# Patient Record
Sex: FEMALE | Race: WHITE | NOT HISPANIC OR LATINO | Employment: UNEMPLOYED | URBAN - METROPOLITAN AREA
[De-identification: names, ages, dates, MRNs, and addresses within clinical notes are randomized per-mention and may not be internally consistent; named-entity substitution may affect disease eponyms.]

---

## 2021-08-10 RX ORDER — FEXOFENADINE HYDROCHLORIDE 60 MG/1
1 TABLET, FILM COATED ORAL AS NEEDED
COMMUNITY

## 2021-08-11 ENCOUNTER — OFFICE VISIT (OUTPATIENT)
Dept: OBGYN CLINIC | Facility: CLINIC | Age: 51
End: 2021-08-11
Payer: COMMERCIAL

## 2021-08-11 ENCOUNTER — VBI (OUTPATIENT)
Dept: ADMINISTRATIVE | Facility: OTHER | Age: 51
End: 2021-08-11

## 2021-08-11 VITALS
DIASTOLIC BLOOD PRESSURE: 72 MMHG | WEIGHT: 124 LBS | SYSTOLIC BLOOD PRESSURE: 120 MMHG | BODY MASS INDEX: 21.97 KG/M2 | HEIGHT: 63 IN

## 2021-08-11 DIAGNOSIS — Z12.31 SCREENING MAMMOGRAM, ENCOUNTER FOR: ICD-10-CM

## 2021-08-11 DIAGNOSIS — Z12.4 ENCOUNTER FOR PAPANICOLAOU SMEAR FOR CERVICAL CANCER SCREENING: Primary | ICD-10-CM

## 2021-08-11 DIAGNOSIS — Z01.812 PRE-PROCEDURE LAB EXAM: ICD-10-CM

## 2021-08-11 DIAGNOSIS — N92.1 MENORRHAGIA WITH IRREGULAR CYCLE: ICD-10-CM

## 2021-08-11 LAB — SL AMB POCT URINE HCG: NEGATIVE

## 2021-08-11 PROCEDURE — 81025 URINE PREGNANCY TEST: CPT | Performed by: NURSE PRACTITIONER

## 2021-08-11 PROCEDURE — 99213 OFFICE O/P EST LOW 20 MIN: CPT | Performed by: NURSE PRACTITIONER

## 2021-08-11 PROCEDURE — 58100 BIOPSY OF UTERUS LINING: CPT | Performed by: NURSE PRACTITIONER

## 2021-08-11 PROCEDURE — G0145 SCR C/V CYTO,THINLAYER,RESCR: HCPCS | Performed by: NURSE PRACTITIONER

## 2021-08-11 PROCEDURE — 88305 TISSUE EXAM BY PATHOLOGIST: CPT | Performed by: PATHOLOGY

## 2021-08-11 PROCEDURE — G0476 HPV COMBO ASSAY CA SCREEN: HCPCS | Performed by: NURSE PRACTITIONER

## 2021-08-11 RX ORDER — OMEGA-3 FATTY ACIDS/FISH OIL 300-1000MG
CAPSULE ORAL 3 TIMES DAILY
COMMUNITY

## 2021-08-11 NOTE — PROGRESS NOTES
Assessment/Plan:  Menorrhagia with irreg periods  Possible causes include fibroids, polyps, normal menstrual changes, dysplasia/cancer  Pt to track cycles This is first cycle which was abnormally close   US to evaluate uterus, endometrial bx peformed, CBC, TSH  Reviewed normal cycle to call < 21 days apart lasting longer than 10 days soaking an ultra tampon in an hour  Management moving forward low dose pill, progesterone, IUD  Diagnoses and all orders for this visit:    Encounter for Papanicolaou smear for cervical cancer screening  -     Liquid-based pap, screening  -     HPV High Risk    Pre-procedure lab exam  -     POCT urine HCG    Menorrhagia with irregular cycle  -     CBC and Platelet; Future  -     TSH w/Reflex; Future  -     US pelvis complete w transvaginal  -     CBC and Platelet  -     TSH w/Reflex  -     Tissue Exam    Screening mammogram, encounter for  -     Mammo screening bilateral w 3d & cad; Future    Other orders  -     Multiple Vitamin (MULTIVITAMINS PO); Take 1 tablet by mouth daily  -     fexofenadine (ALLEGRA) 60 MG tablet; Take 1 tablet by mouth as needed (Patient not taking: Reported on 8/11/2021)  -     Ibuprofen 200 MG CAPS; 3 (three) times a day  -     Cancel: Tissue Pathology          Subjective:      Patient ID: Katey Razo is a 54 y o  female  Here for eval irreg bleeding  [facility] 10/2018 DR Prather Last pap 6/2017 neg/neg  Periods pretty reg , however over the past 3 months have become irregular, more frequent Thinks it is  Perimenopause  2 weeks ago normal period ended and a few days later came back lasts a few days 4-5 days light flow but has to wear tampon  Was feeling run down Bleeding stopped Feels better this week  No cramps  No breast tenderness  No other abdominal or pelvic pain Bowel and bladder are normal  Weight stable  Tubal for bc         The following portions of the patient's history were reviewed and updated as appropriate: allergies, current medications, past family history, past medical history, past social history, past surgical history and problem list     Review of Systems   Constitutional: Positive for fatigue  Gastrointestinal: Negative for abdominal pain, constipation and diarrhea  Genitourinary: Positive for menstrual problem and vaginal bleeding  Negative for dysuria and pelvic pain  Objective:      /72   Ht 5' 3" (1 6 m)   Wt 56 2 kg (124 lb)   LMP 08/04/2021 (Within Days)   Breastfeeding No   BMI 21 97 kg/m²          Physical Exam  Vitals and nursing note reviewed  Constitutional:       General: She is not in acute distress  Appearance: Normal appearance  HENT:      Head: Normocephalic and atraumatic  Pulmonary:      Effort: Pulmonary effort is normal    Chest:      Breasts: Breasts are symmetrical          Right: Normal  No mass, nipple discharge, skin change or tenderness  Left: Normal  No mass, nipple discharge, skin change or tenderness  Abdominal:      General: There is no distension  Palpations: Abdomen is soft  Tenderness: There is no abdominal tenderness  There is no guarding or rebound  Genitourinary:     General: Normal vulva  Exam position: Lithotomy position  Labia:         Right: No rash, tenderness, lesion or injury  Left: No rash, tenderness, lesion or injury  Urethra: No prolapse, urethral pain, urethral swelling or urethral lesion  Vagina: Normal  No erythema or lesions  Cervix: No cervical motion tenderness, discharge, lesion or cervical bleeding  Uterus: Normal        Adnexa: Right adnexa normal and left adnexa normal         Right: No mass or tenderness  Left: No mass or tenderness  Rectum: No mass or external hemorrhoid  Comments: PAP obtained cervix EMB performed  Musculoskeletal:         General: Normal range of motion  Lymphadenopathy:      Upper Body:      Right upper body: No axillary adenopathy        Left upper body: No axillary adenopathy  Lower Body: No right inguinal adenopathy  No left inguinal adenopathy  Skin:     General: Skin is warm and dry  Neurological:      Mental Status: She is alert and oriented to person, place, and time  Psychiatric:         Mood and Affect: Mood normal          Behavior: Behavior normal          Thought Content: Thought content normal          Judgment: Judgment normal        Endometrial biopsy    Date/Time: 8/18/2021 6:16 AM  Performed by: JAMIE Jara  Authorized by: JAMIE Jara   Universal Protocol:  Procedure performed by:  Consent: Verbal consent obtained  Risks and benefits: risks, benefits and alternatives were discussed  Consent given by: patient  Patient understanding: patient states understanding of the procedure being performed  Patient identity confirmed: verbally with patient      Indication:     Indications:  Other disorder of menstruation and other abnormal bleeding from female genital tract    Procedure:     Procedure: endometrial biopsy with Pipelle      A bivalve speculum was placed in the vagina: yes      Cervix cleaned and prepped: yes      Uterus sounded: yes      Uterus sound depth (cm):  7    Specimen collected: specimen collected and sent to pathology      Patient tolerated procedure well with no complications: yes

## 2021-08-13 LAB
HPV HR 12 DNA CVX QL NAA+PROBE: NEGATIVE
HPV16 DNA CVX QL NAA+PROBE: NEGATIVE
HPV18 DNA CVX QL NAA+PROBE: NEGATIVE

## 2021-08-17 LAB
LAB AP GYN PRIMARY INTERPRETATION: NORMAL
LAB AP LMP: NORMAL
Lab: NORMAL

## 2021-08-18 NOTE — PATIENT INSTRUCTIONS
Menorrhagia with irreg periods  Possible causes include fibroids, polyps, normal menstrual changes, dysplasia/cancer  Pt to track cycles This is first cycle which was abnormally close   US to evaluate uterus, endometrial bx peformed, CBC, TSH  Reviewed normal cycle to call < 21 days apart lasting longer than 10 days soaking an ultra tampon in an hour  Management moving forward low dose pill, progesterone, IUD

## 2022-12-08 NOTE — PROGRESS NOTES
Assessment/Plan:    Encounter for gynecological examination (general) (routine) without abnormal findings  Here for well check  Notes cycle changes with LMP 22 - today  This happens approximately 4 times a year  Had u/s, TSH , endo bx last year, all WNL  Normal exam, u/s ordered  Will schedule 6 wk follow up  Thyroid nodule noted as in past    Normal breast exam  Mammo order given, last 2021  Last pap 21 neg/HPV neg; due     Colonoscopy ,  due     Thyroid nodule  Endocrine biopsy , was to follow up 2018  Ultrasound ordered and encouraged f/u with [facility] endocrinology  Dysuria  4 courses of antibiotics per year, no cultures  UTIs post coital    Recommend urinalysis and culture with next bout prior to antibiotics and then to consider post coital macrobid  Diagnoses and all orders for this visit:    Encounter for gynecological examination (general) (routine) without abnormal findings    Encounter for screening mammogram for malignant neoplasm of breast  -     Mammo screening bilateral w 3d & cad; Future    Irregular bleeding  -     US pelvis complete w transvaginal; Future    Thyroid nodule  -     US head neck soft tissue; Future    Dysuria  -     Urine culture; Future  -     Cancel: Urinalysis with microscopic  -     Urine culture  -     Urinalysis with microscopic          Subjective:      Patient ID: Donte Saunders is a 50 y o  female  HPI Here for well check  The following portions of the patient's history were reviewed and updated as appropriate:   She  has a past medical history of H/O mammogram (10/01/2018), Papanicolaou smear for cervical cancer screening (), and Thyroid nodule    She   Patient Active Problem List    Diagnosis Date Noted   • Encounter for gynecological examination (general) (routine) without abnormal findings 2022   • Thyroid nodule 2022   • Dysuria 2022     She  has a past surgical history that includes  section; Tubal ligation; US guided thyroid biopsy (2017); Mammo (historical) (Bilateral, 2018); and Colonoscopy (2020)  Her family history includes Stroke in her maternal grandfather; Stroke (age of onset: 70) in her mother  She  reports that she has never smoked  She has never used smokeless tobacco  She reports current alcohol use  She reports that she does not use drugs  Current Outpatient Medications   Medication Sig Dispense Refill   • Multiple Vitamin (MULTIVITAMINS PO) Take 1 tablet by mouth daily       No current facility-administered medications for this visit  She is allergic to vancomycin       Review of Systems  +irregular bleeding  No breast or bowel complaints     +dysuria postcoitally, approx 4 times per year      Objective:      /70 (BP Location: Left arm, Patient Position: Sitting, Cuff Size: Standard)   Ht 5' 2" (1 575 m)   Wt 56 1 kg (123 lb 9 6 oz)   LMP 11/28/2022 (Exact Date)   Breastfeeding No   BMI 22 61 kg/m²          Physical Exam    General appearance: no distress, pleasant  Neck: thyroid with prominent right nodule, no palpable adenopathy  Lymph nodes: no palpable adenopathy  Breasts: no masses, nodes or skin changes  Abdomen: soft, non tender, no palpable masses  Pelvic exam: normal external genitalia, urethral meatus normal, vagina without lesions, cervix without lesions, nulliparous, light bloody mucous, uterus small, non tender, no adnexal masses, non tender  Rectal exam: no masses, non tender, RV confirms above

## 2022-12-12 ENCOUNTER — ANNUAL EXAM (OUTPATIENT)
Dept: OBGYN CLINIC | Facility: CLINIC | Age: 52
End: 2022-12-12

## 2022-12-12 VITALS
DIASTOLIC BLOOD PRESSURE: 70 MMHG | BODY MASS INDEX: 22.74 KG/M2 | SYSTOLIC BLOOD PRESSURE: 110 MMHG | HEIGHT: 62 IN | WEIGHT: 123.6 LBS

## 2022-12-12 DIAGNOSIS — N92.6 IRREGULAR BLEEDING: ICD-10-CM

## 2022-12-12 DIAGNOSIS — E04.1 THYROID NODULE: ICD-10-CM

## 2022-12-12 DIAGNOSIS — R30.0 DYSURIA: ICD-10-CM

## 2022-12-12 DIAGNOSIS — Z01.419 ENCOUNTER FOR GYNECOLOGICAL EXAMINATION (GENERAL) (ROUTINE) WITHOUT ABNORMAL FINDINGS: Primary | ICD-10-CM

## 2022-12-12 DIAGNOSIS — Z12.31 ENCOUNTER FOR SCREENING MAMMOGRAM FOR MALIGNANT NEOPLASM OF BREAST: ICD-10-CM

## 2022-12-12 NOTE — ASSESSMENT & PLAN NOTE
Endocrine biopsy 2017, was to follow up 2018  Ultrasound ordered and encouraged f/u with [facility] endocrinology

## 2022-12-12 NOTE — LETTER
December 12, 2022     [address]  [facility] [address]    Patient: Radha Araiza   YOB: 1970   Date of Visit: 12/12/2022       Dear Dr Giselle Kitchen: Thank you for referring Radha Araiza to me for evaluation  Below are my notes for this consultation  If you have questions, please do not hesitate to call me  I look forward to following your patient along with you  Sincerely,        Marlon Harvey MD        CC: No Recipients  Marlon Harvey MD  12/12/2022  4:52 PM  Sign when Signing Visit  Assessment/Plan:    Encounter for gynecological examination (general) (routine) without abnormal findings  Here for well check  Notes cycle changes with LMP 11/28/22 - today  This happens approximately 4 times a year  Had u/s, TSH , endo bx last year, all WNL  Normal exam, u/s ordered  Will schedule 6 wk follow up  Thyroid nodule noted as in past    Normal breast exam  Mammo order given, last 9/14/2021  Last pap 8/11/21 neg/HPV neg; due 2026    Colonoscopy 2020,  due 2025    Thyroid nodule  Endocrine biopsy 2017, was to follow up 2018  Ultrasound ordered and encouraged f/u with [facility] endocrinology  Dysuria  4 courses of antibiotics per year, no cultures  UTIs post coital    Recommend urinalysis and culture with next bout prior to antibiotics and then to consider post coital macrobid  Diagnoses and all orders for this visit:    Encounter for gynecological examination (general) (routine) without abnormal findings    Encounter for screening mammogram for malignant neoplasm of breast  -     Mammo screening bilateral w 3d & cad; Future    Irregular bleeding  -     US pelvis complete w transvaginal; Future    Thyroid nodule  -     US head neck soft tissue; Future    Dysuria  -     Urine culture; Future  -     Cancel: Urinalysis with microscopic  -     Urine culture  -     Urinalysis with microscopic         Subjective:     Patient ID: Radha Araiza is a 50 y o  female      HPI Here for well check  The following portions of the patient's history were reviewed and updated as appropriate:   She  has a past medical history of H/O mammogram (10/01/2018), Papanicolaou smear for cervical cancer screening (), and Thyroid nodule  She   Patient Active Problem List    Diagnosis Date Noted   • Encounter for gynecological examination (general) (routine) without abnormal findings 2022   • Thyroid nodule 2022   • Dysuria 2022     She  has a past surgical history that includes  section; Tubal ligation; US guided thyroid biopsy (); Mammo (historical) (Bilateral, ); and Colonoscopy ()  Her family history includes Stroke in her maternal grandfather; Stroke (age of onset: 70) in her mother  She  reports that she has never smoked  She has never used smokeless tobacco  She reports current alcohol use  She reports that she does not use drugs  Current Outpatient Medications   Medication Sig Dispense Refill   • Multiple Vitamin (MULTIVITAMINS PO) Take 1 tablet by mouth daily       No current facility-administered medications for this visit  She is allergic to vancomycin       Review of Systems +irregular bleeding  No breast or bowel complaints     +dysuria postcoitally, approx 4 times per year      Objective:      /70 (BP Location: Left arm, Patient Position: Sitting, Cuff Size: Standard)   Ht 5' 2" (1 575 m)   Wt 56 1 kg (123 lb 9 6 oz)   LMP 2022 (Exact Date)   Breastfeeding No   BMI 22 61 kg/m²         Physical Exam   General appearance: no distress, pleasant  Neck: thyroid with prominent right nodule, no palpable adenopathy  Lymph nodes: no palpable adenopathy  Breasts: no masses, nodes or skin changes  Abdomen: soft, non tender, no palpable masses  Pelvic exam: normal external genitalia, urethral meatus normal, vagina without lesions, cervix without lesions, nulliparous, light bloody mucous, uterus small, non tender, no adnexal masses, non tender  Rectal exam: no masses, non tender, RV confirms above

## 2022-12-12 NOTE — ASSESSMENT & PLAN NOTE
4 courses of antibiotics per year, no cultures  UTIs post coital    Recommend urinalysis and culture with next bout prior to antibiotics and then to consider post coital macrobid

## 2022-12-12 NOTE — ASSESSMENT & PLAN NOTE
Here for well check  Notes cycle changes with LMP 11/28/22 - today  This happens approximately 4 times a year  Had u/s, TSH , endo bx last year, all WNL  Normal exam, u/s ordered    Thyroid nodule noted as in past    Normal breast exam  Mammo order given, last 9/14/2021  Last pap 8/11/21 neg/HPV neg; due 2026    Colonoscopy 2020,  due 2025

## 2022-12-22 ENCOUNTER — TELEPHONE (OUTPATIENT)
Dept: OBGYN CLINIC | Facility: CLINIC | Age: 52
End: 2022-12-22

## 2022-12-22 NOTE — TELEPHONE ENCOUNTER
Mansi inquiring about her Ultrasound results  Left message on Mansi's voice mail, results still pending  [facility] is experiencing approx 14 day turn around time  Recommended to f/u with PCP as to lab results  Will send message to Dr Robert Fuller, once reviewed will contact her

## 2022-12-27 ENCOUNTER — TELEPHONE (OUTPATIENT)
Dept: OBGYN CLINIC | Facility: CLINIC | Age: 52
End: 2022-12-27

## 2022-12-27 DIAGNOSIS — Z13.29 SCREENING FOR THYROID DISORDER: ICD-10-CM

## 2022-12-27 DIAGNOSIS — E04.1 THYROID NODULE: Primary | ICD-10-CM

## 2022-12-27 NOTE — TELEPHONE ENCOUNTER
Please call Mansi with results of pelvic and thyroid ultrasounds  I sent this [employer] message:    Kareem Nazario  Your the ultrasound of your pelvis looks fine, with a very active right ovary  There are no suspicious characteristics to the cysts seen on the right  They are most likely due to ovulation and post-ovulation changes  We will need to repeat the scan in 6-8 wks to see that the cysts have resolved  Your thyroid does need some attention  The ultrasound of it showed a 2 8 cm nodule that warrants endocrinology evaluation with possible biopsy  They also noted  an area below the left lobe that may be a node or parathyroid adenoma  I have asked my clinical staff to reach out you with those recommendations  I can order some blood work for you prior to an endocrinology appointment that may be help to make things more efficient  I placed orders for parathyroid hormone, vitamin D, calcium and thyroid function tests     Mansi has been seen at [facility] endocrinology in the past  Other options would be Endocrinologists:  Mandy Hamilton MD  Phone: [phone] - [address] [facility],  Phone: [phone] - Anitra Garcia MD, [phone] - [facility]

## 2023-01-09 DIAGNOSIS — Z12.31 ENCOUNTER FOR SCREENING MAMMOGRAM FOR MALIGNANT NEOPLASM OF BREAST: ICD-10-CM

## 2023-01-21 NOTE — PROGRESS NOTES
Assessment/Plan:    Irregular bleeding  Has not skipped any cycles  Bled irregularly from LMP  through December  Last endo bx benign 2021  No bleeding today   Ultrasound reviewed with 6 mm EMS and right ovarian cysts - 4 9 and 5 cm  U/S ordered for follow up  Option of endo bx reviewed, would like to proceed  Endo bx with ease, will call with results  Option for cyclic progesterone reviewed for regularity, will discuss after biopsy and imaging f/u resulted  Scheduled to see rheumatology for elevated ARA and autoimmune aspect of Hashimoto thyroiditis  Other ovarian cyst, right side  Noted on imaging, asymptomatic  Recommend period track feng and follow up u/s after next cycle  Orders given  Thyroid nodule  Reviewed telehealth visit on Care Everywhere and is scheduled for biopsy with [facility] team of larger nodule  Diagnoses and all orders for this visit:    Irregular bleeding  -     US pelvis complete w transvaginal; Future  -     Endometrial biopsy  -     Histology Specimen    Thyroid nodule    Other ovarian cyst, right side    Other orders  -     Discontinue: metroNIDAZOLE (METROCREAM) 0 75 % cream;  (Patient not taking: Reported on 2023)          Subjective:      Patient ID: Katina Gowers is a 50 y o  female  HPI  Here to follow up testing and address irregular bleeding  The following portions of the patient's history were reviewed and updated as appropriate:   She  has a past medical history of H/O mammogram (10/01/2018), Papanicolaou smear for cervical cancer screening (), and Thyroid nodule    She   Patient Active Problem List    Diagnosis Date Noted   • Irregular bleeding 2023   • Other ovarian cyst, right side 2023   • Encounter for gynecological examination (general) (routine) without abnormal findings 2022   • Thyroid nodule 2022     She  has a past surgical history that includes  section; Tubal ligation; US guided thyroid biopsy (2017); Mammo (historical) (Bilateral, 2018); and Colonoscopy (2020)  Her family history includes Stroke in her maternal grandfather; Stroke (age of onset: 70) in her mother  She  reports that she has never smoked  She has never used smokeless tobacco  She reports current alcohol use  She reports that she does not use drugs  Current Outpatient Medications   Medication Sig Dispense Refill   • Multiple Vitamin (MULTIVITAMINS PO) Take 1 tablet by mouth daily       No current facility-administered medications for this visit  She is allergic to vancomycin       Review of Systems  Bleeding stopped  No pain  Objective:      /70   Ht 5' 2" (1 575 m)   Wt 55 2 kg (121 lb 12 8 oz)   LMP 01/10/2023 (Approximate)   Breastfeeding No   BMI 22 28 kg/m²          Physical Exam    Appears well, no apparent distress  Does not appear anxious or depressed  Pelvic exam: normal external genitalia, vagina normal no abnormal discharge, cervix without lesions    12/26/22 u/s : Uterus 7 7 cm with 6 mm EMS with small amt of fluid in lower uterine segment  R ov 7 3 cm with 4 9 and 5 cm cysts  L ov 2 5 cm, WNL  Images reviewed  Right ovary with very thin septation and probable hemorrhagic cyst adjacent to simple cyst     12/26/22 thyroid u/s: 2 8 cm right nodule; 1 2 cm lesion below left lobe    All labs reviewed - ARA, TSH, PTH, thyroid u/s, pelvic u/s    Endometrial biopsy    Date/Time: 1/24/2023 1:56 PM  Performed by: Jes Steven MD  Authorized by: Jes Steven MD   Universal Protocol:  Consent: Verbal consent obtained  Written consent obtained    Risks and benefits: risks, benefits and alternatives were discussed  Consent given by: patient  Patient understanding: patient states understanding of the procedure being performed  Patient consent: the patient's understanding of the procedure matches consent given  Relevant documents: relevant documents present and verified  Patient identity confirmed: verbally with patient      Indication:     Indications:  Other disorder of menstruation and other abnormal bleeding from female genital tract    Procedure:     Procedure: endometrial biopsy with Pipelle      A bivalve speculum was placed in the vagina: yes      Cervix cleaned and prepped: yes      A paracervical block was performed: no      The cervix was dilated: no      Uterus sounded: yes      Uterus sound depth (cm):  7    Specimen collected: specimen collected and sent to pathology      Patient tolerated procedure well with no complications: yes    Findings:     Uterus size:  Non-gravid    Cervix: normal    Comments:     Procedure comments:  Endo bx with ease

## 2023-01-24 ENCOUNTER — OFFICE VISIT (OUTPATIENT)
Dept: OBGYN CLINIC | Facility: CLINIC | Age: 53
End: 2023-01-24

## 2023-01-24 VITALS
WEIGHT: 121.8 LBS | HEIGHT: 62 IN | SYSTOLIC BLOOD PRESSURE: 110 MMHG | BODY MASS INDEX: 22.41 KG/M2 | DIASTOLIC BLOOD PRESSURE: 70 MMHG

## 2023-01-24 DIAGNOSIS — E04.1 THYROID NODULE: ICD-10-CM

## 2023-01-24 DIAGNOSIS — N83.291 OTHER OVARIAN CYST, RIGHT SIDE: ICD-10-CM

## 2023-01-24 DIAGNOSIS — N92.6 IRREGULAR BLEEDING: Primary | ICD-10-CM

## 2023-01-24 PROBLEM — R30.0 DYSURIA: Status: RESOLVED | Noted: 2022-12-12 | Resolved: 2023-01-24

## 2023-01-24 NOTE — ASSESSMENT & PLAN NOTE
Reviewed telehealth visit on Care Everywhere and is scheduled for biopsy with [facility] team of larger nodule

## 2023-01-24 NOTE — ASSESSMENT & PLAN NOTE
Has not skipped any cycles  Bled irregularly from LMP 11/28 through December  Last endo bx benign 8/2021  No bleeding today   Ultrasound reviewed with 6 mm EMS and right ovarian cysts - 4 9 and 5 cm  U/S ordered for follow up  Option of endo bx reviewed, would like to proceed  Endo bx with ease, will call with results  Option for cyclic progesterone reviewed for regularity, will discuss after biopsy and imaging f/u resulted  Scheduled to see rheumatology for elevated ARA and autoimmune aspect of Hashimoto thyroiditis

## 2023-01-24 NOTE — LETTER
January 24, 2023     [address] 82  [facility] [place] [address]    Patient: Reuben Bowen   YOB: 1970   Date of Visit: 1/24/2023       Dear Dr Sera Carrillo: Thank you for referring Reuben Bowen to me for evaluation  Below are my notes for this consultation  If you have questions, please do not hesitate to call me  I look forward to following your patient along with you  Sincerely,        Dash Harvey MD        CC: No Recipients  Dash Harvey MD  1/24/2023  1:59 PM  Sign when Signing Visit  Assessment/Plan:    Irregular bleeding  Has not skipped any cycles  Bled irregularly from LMP 11/28 through December  Last endo bx benign 8/2021  No bleeding today   Ultrasound reviewed with 6 mm EMS and right ovarian cysts - 4 9 and 5 cm  U/S ordered for follow up  Option of endo bx reviewed, would like to proceed  Endo bx with ease, will call with results  Option for cyclic progesterone reviewed for regularity, will discuss after biopsy and imaging f/u resulted  Scheduled to see rheumatology for elevated ARA and autoimmune aspect of Hashimoto thyroiditis  Other ovarian cyst, right side  Noted on imaging, asymptomatic  Recommend period track feng and follow up u/s after next cycle  Orders given  Thyroid nodule  Reviewed telehealth visit on Care Everywhere and is scheduled for biopsy with [facility] team of larger nodule  Diagnoses and all orders for this visit:    Irregular bleeding  -     US pelvis complete w transvaginal; Future  -     Endometrial biopsy  -     Histology Specimen    Thyroid nodule    Other ovarian cyst, right side    Other orders  -     Discontinue: metroNIDAZOLE (METROCREAM) 0 75 % cream;  (Patient not taking: Reported on 1/24/2023)         Subjective:     Patient ID: Reuben Bowen is a 50 y o  female  HPI  Here to follow up testing and address irregular bleeding       The following portions of the patient's history were reviewed and updated as appropriate:   She  has a past medical history of H/O mammogram (10/01/2018), Papanicolaou smear for cervical cancer screening (2017), and Thyroid nodule  She   Patient Active Problem List    Diagnosis Date Noted   • Irregular bleeding 2023   • Other ovarian cyst, right side 2023   • Encounter for gynecological examination (general) (routine) without abnormal findings 2022   • Thyroid nodule 2022     She  has a past surgical history that includes  section; Tubal ligation; US guided thyroid biopsy (); Mammo (historical) (Bilateral, ); and Colonoscopy ()  Her family history includes Stroke in her maternal grandfather; Stroke (age of onset: 70) in her mother  She  reports that she has never smoked  She has never used smokeless tobacco  She reports current alcohol use  She reports that she does not use drugs  Current Outpatient Medications   Medication Sig Dispense Refill   • Multiple Vitamin (MULTIVITAMINS PO) Take 1 tablet by mouth daily       No current facility-administered medications for this visit  She is allergic to vancomycin       Review of Systems Bleeding stopped  No pain  Objective:      /70   Ht 5' 2" (1 575 m)   Wt 55 2 kg (121 lb 12 8 oz)   LMP 01/10/2023 (Approximate)   Breastfeeding No   BMI 22 28 kg/m²         Physical Exam   Appears well, no apparent distress  Does not appear anxious or depressed  Pelvic exam: normal external genitalia, vagina normal no abnormal discharge, cervix without lesions    22 u/s : Uterus 7 7 cm with 6 mm EMS with small amt of fluid in lower uterine segment  R ov 7 3 cm with 4 9 and 5 cm cysts  L ov 2 5 cm, WNL  Images reviewed   Right ovary with very thin septation and probable hemorrhagic cyst adjacent to simple cyst     22 thyroid u/s: 2 8 cm right nodule; 1 2 cm lesion below left lobe    All labs reviewed - ARA, TSH, PTH, thyroid u/s, pelvic u/s    Endometrial biopsy    Date/Time: 2023 1:56 PM  Performed by: Josh Negron MD  Authorized by: Josh Negron MD   Universal Protocol:  Consent: Verbal consent obtained  Written consent obtained  Risks and benefits: risks, benefits and alternatives were discussed  Consent given by: patient  Patient understanding: patient states understanding of the procedure being performed  Patient consent: the patient's understanding of the procedure matches consent given  Relevant documents: relevant documents present and verified  Patient identity confirmed: verbally with patient      Indication:     Indications:  Other disorder of menstruation and other abnormal bleeding from female genital tract    Procedure:     Procedure: endometrial biopsy with Pipelle      A bivalve speculum was placed in the vagina: yes      Cervix cleaned and prepped: yes      A paracervical block was performed: no      The cervix was dilated: no      Uterus sounded: yes      Uterus sound depth (cm):  7    Specimen collected: specimen collected and sent to pathology      Patient tolerated procedure well with no complications: yes    Findings:     Uterus size:  Non-gravid    Cervix: normal    Comments:     Procedure comments:  Endo bx with ease

## 2023-01-24 NOTE — PATIENT INSTRUCTIONS
Call for bleeding that occurs earlier than 21 days, lasts longer than 10 days or for bleeding between cycles  Get your ultrasound day 8-10 of the next cycle  Please call the office if you do not hear about your results in 10-14 days

## 2023-01-24 NOTE — ASSESSMENT & PLAN NOTE
Noted on imaging, asymptomatic  Recommend period track feng and follow up u/s after next cycle  Orders given

## 2023-01-30 ENCOUNTER — TELEPHONE (OUTPATIENT)
Dept: OBGYN CLINIC | Facility: CLINIC | Age: 53
End: 2023-01-30

## 2023-01-30 LAB
PATH REPORT.SITE OF ORIGIN SPEC: NORMAL
PAYMENT PROCEDURE: NORMAL
SL AMB .: NORMAL

## 2023-03-23 ENCOUNTER — TELEPHONE (OUTPATIENT)
Dept: OBGYN CLINIC | Facility: CLINIC | Age: 53
End: 2023-03-23

## 2023-03-23 DIAGNOSIS — R30.0 DYSURIA: Primary | ICD-10-CM

## 2023-03-23 RX ORDER — NITROFURANTOIN 25; 75 MG/1; MG/1
100 CAPSULE ORAL 2 TIMES DAILY
Qty: 14 CAPSULE | Refills: 1 | Status: SHIPPED | OUTPATIENT
Start: 2023-03-23

## 2023-03-23 NOTE — TELEPHONE ENCOUNTER
Mansi provided the urine sample to the lab  She is having Increasing frequency and discomfort with urination  Requesting call back with update if rx can be provided

## 2023-03-23 NOTE — TELEPHONE ENCOUNTER
Mansi called requesting orders for urine and urine culture and treatment for UTI  She reports she discussed frequent UTI's with Dr Chavo Vaca in December  Mansi's spouse is physician who typically prescribes antibiotic when she has the symptoms  Dr Chavo Vaca recommended she call with next occurrence to complete urine and culture  Mansi reports pelvic pressure, frequency and burning with urination  She will provide urine sample to [facility] today  Requesting Dr Chavo Vaca provide treatment in the meantime  Confirmed allergies on file and pharmacy  Ua orders updated in chart   Dr Chavo Vaca, please advise on antibiotic treatment  (patient does not need call back if rx is provided)

## 2023-03-26 LAB
APPEARANCE UR: ABNORMAL
BACTERIA UR CULT: ABNORMAL
BACTERIA URNS QL MICRO: ABNORMAL
BILIRUB UR QL STRIP: NEGATIVE
CASTS URNS QL MICRO: ABNORMAL /LPF
COLOR UR: YELLOW
CRYSTALS URNS MICRO: ABNORMAL
EPI CELLS #/AREA URNS HPF: ABNORMAL /HPF (ref 0–10)
GLUCOSE UR QL: NEGATIVE
HGB UR QL STRIP: ABNORMAL
KETONES UR QL STRIP: ABNORMAL
LEUKOCYTE ESTERASE UR QL STRIP: ABNORMAL
Lab: ABNORMAL
MICRO URNS: ABNORMAL
NITRITE UR QL STRIP: POSITIVE
PH UR STRIP: 5 [PH] (ref 5–7.5)
PROT UR QL STRIP: NEGATIVE
RBC #/AREA URNS HPF: ABNORMAL /HPF (ref 0–2)
SL AMB ANTIMICROBIAL SUSCEPTIBILITY: ABNORMAL
SP GR UR: 1.02 (ref 1–1.03)
UNIDENT CRYS URNS QL MICRO: PRESENT
UROBILINOGEN UR STRIP-ACNC: 0.2 MG/DL (ref 0.2–1)
WBC #/AREA URNS HPF: >30 /HPF (ref 0–5)

## 2023-03-27 ENCOUNTER — TELEPHONE (OUTPATIENT)
Dept: OBGYN CLINIC | Facility: CLINIC | Age: 53
End: 2023-03-27

## 2023-03-27 DIAGNOSIS — B96.89 UTI DUE TO KLEBSIELLA SPECIES: Primary | ICD-10-CM

## 2023-03-27 DIAGNOSIS — N39.0 UTI DUE TO KLEBSIELLA SPECIES: Primary | ICD-10-CM

## 2023-03-27 RX ORDER — CIPROFLOXACIN 500 MG/1
500 TABLET, FILM COATED ORAL EVERY 12 HOURS SCHEDULED
Qty: 14 TABLET | Refills: 1 | Status: SHIPPED | OUTPATIENT
Start: 2023-03-27 | End: 2023-03-27 | Stop reason: CLARIF

## 2023-03-27 RX ORDER — AMOXICILLIN AND CLAVULANATE POTASSIUM 875; 125 MG/1; MG/1
1 TABLET, FILM COATED ORAL EVERY 12 HOURS SCHEDULED
Qty: 14 TABLET | Refills: 0 | Status: SHIPPED | OUTPATIENT
Start: 2023-03-27 | End: 2023-04-03

## 2023-03-27 NOTE — TELEPHONE ENCOUNTER
Mansi is questioning if can change Cipro to another ABx    She remembers having joint pain with Cipro in the past  Sent message to Dr Cara Malave

## 2023-03-27 NOTE — TELEPHONE ENCOUNTER
Please call pt with change in antibiotic for UTI after culture revealed resistant bacteria to prescribed nitrofurantoin  New rx sent to [facility] and order for follow up UC sent to [facility] [place]    Thanks

## 2023-04-24 ENCOUNTER — PATIENT MESSAGE (OUTPATIENT)
Dept: OBGYN CLINIC | Facility: CLINIC | Age: 53
End: 2023-04-24

## 2023-04-24 DIAGNOSIS — N39.0 UTI DUE TO KLEBSIELLA SPECIES: Primary | ICD-10-CM

## 2023-04-24 DIAGNOSIS — B96.89 UTI DUE TO KLEBSIELLA SPECIES: Primary | ICD-10-CM

## 2023-04-24 RX ORDER — CIPROFLOXACIN 500 MG/1
500 TABLET, FILM COATED ORAL EVERY 12 HOURS SCHEDULED
Qty: 14 TABLET | Refills: 0 | Status: SHIPPED | OUTPATIENT
Start: 2023-04-24 | End: 2023-05-01

## 2023-04-27 LAB
BACTERIA UR CULT: ABNORMAL
Lab: ABNORMAL
SL AMB ANTIMICROBIAL SUSCEPTIBILITY: ABNORMAL

## 2023-06-16 ENCOUNTER — PATIENT MESSAGE (OUTPATIENT)
Dept: OBGYN CLINIC | Facility: CLINIC | Age: 53
End: 2023-06-16

## 2023-06-16 DIAGNOSIS — N39.0 FREQUENT UTI: Primary | ICD-10-CM

## 2023-06-30 DIAGNOSIS — R30.0 DYSURIA: Primary | ICD-10-CM

## 2023-06-30 LAB
BACTERIA UR CULT: ABNORMAL
Lab: ABNORMAL
SL AMB ANTIMICROBIAL SUSCEPTIBILITY: ABNORMAL

## 2023-06-30 RX ORDER — CIPROFLOXACIN 500 MG/1
500 TABLET, FILM COATED ORAL EVERY 12 HOURS SCHEDULED
Qty: 14 TABLET | Refills: 0 | Status: SHIPPED | OUTPATIENT
Start: 2023-06-30 | End: 2023-07-07

## 2023-07-01 LAB
BACTERIA UR CULT: NORMAL
Lab: NORMAL

## 2024-01-09 ENCOUNTER — TELEPHONE (OUTPATIENT)
Dept: OBGYN CLINIC | Facility: CLINIC | Age: 54
End: 2024-01-09

## 2024-01-09 DIAGNOSIS — Z12.31 ENCOUNTER FOR SCREENING MAMMOGRAM FOR MALIGNANT NEOPLASM OF BREAST: Primary | ICD-10-CM

## 2024-01-09 NOTE — TELEPHONE ENCOUNTER
Spoke with patient and informed her that a mammogram order was placed and test can be scheduled now, at her earliest convenience.  Pt verbalized understanding and voiced appreciation.

## 2024-01-09 NOTE — TELEPHONE ENCOUNTER
Mee @[facility] called, patient was scheduled today for her Mammogram but had no order. Mee informed that no order for Mammogram was generated, however, an Pelvic Ultrasound was. Mee will verbally inform patient an order has been requested and she will have to reschedule study.

## 2024-02-21 PROBLEM — Z01.419 ENCOUNTER FOR GYNECOLOGICAL EXAMINATION (GENERAL) (ROUTINE) WITHOUT ABNORMAL FINDINGS: Status: RESOLVED | Noted: 2022-12-12 | Resolved: 2024-02-21

## 2024-07-18 NOTE — PROGRESS NOTES
Assessment/Plan:    Encounter for gynecological examination (general) (routine) without abnormal findings  Here for well check, c/o menopausal symptoms (hot flashes, night sweats, decreased libido, anxiety). Last period 6 months ago.   Had partial thyroidectomy at [place] 5/2024, continues to follow with endocrine there.  No other breast or gyn concerns.   Normal breast and pelvic examss  Last pap 8/2021 neg/HPV neg; due next time.  Mammo order given, last 12/15/22, encouraged.  Colonoscopy 2020, due 2025; Dr Bhatti. May see earlier due to increase in stool frequency.      Menopausal symptoms  Discussed with patient hot flashes and menopausal symptoms. Behavioral modification, avoidance of exaccerbating agents (alcohol, red wine, hot liquids), natural herbs (black cohash) and hormone replacement therapy reviewed. Aware of risks of estrogen including increased risk of clots in legs and lungs and cardiovascular events if started late in menopause.   Option for SSRI therapy with additional help with anxiety symptoms discussed.   Will try sertraline and RTO 4 months for consideration of other options if needed.   Agrees to plan    Other ovarian cyst, right side  Asymptomatic. Reviewed 12/14/22 u/s: Uterus 7.7 cm with 6 mm EMS with small amt of fluid in lower uterine segment. R ov 7.3 cm with 4.9 and 5 cm cysts. L ov 2.5 cm, WNL.  Images reviewed. Right ovary with very thin septation and probable hemorrhagic cyst adjacent to simple cyst    Did not obtain recommended f/u ultrasound. Agrees to schedule, orders placed.       Diagnoses and all orders for this visit:    Encounter for screening mammogram for breast cancer  -     Mammo screening bilateral w 3d & cad; Future    Encounter for gynecological examination (general) (routine) without abnormal findings    Multinodular goiter - s/p right lobectomy    Other ovarian cyst, right side  -     US pelvis complete w transvaginal; Future    Menopausal symptoms  -     sertraline  (Zoloft) 50 mg tablet; Take 1 tablet (50 mg total) by mouth daily , start with 1/2 tablet for the first week.    Other orders  -     levothyroxine 75 mcg tablet; Take 75 mcg by mouth daily          Subjective:      Patient ID: Mansi Lamar is a 54 y.o. female.    HPI Here for well check.    The following portions of the patient's history were reviewed and updated as appropriate: She  has a past medical history of H/O mammogram (10/01/2018), Multinodular goiter - s/p right lobectomy, Papanicolaou smear for cervical cancer screening (), and Postsurgical hypothyroidism.  She   Patient Active Problem List    Diagnosis Date Noted    Menopausal symptoms 2024    Multinodular goiter - s/p right lobectomy     Irregular bleeding 2023    Other ovarian cyst, right side 2023     She  has a past surgical history that includes  section; Tubal ligation; US guided thyroid biopsy (); Mammo (historical) (Bilateral, ); Colonoscopy (); and Thyroid lobectomy (Right).  Her family history includes Stroke in her maternal grandfather; Stroke (age of onset: 71) in her mother.  She  reports that she has never smoked. She has never used smokeless tobacco. She reports current alcohol use. She reports that she does not use drugs.  Current Outpatient Medications   Medication Sig Dispense Refill    levothyroxine 75 mcg tablet Take 75 mcg by mouth daily      Multiple Vitamin (MULTIVITAMINS PO) Take 1 tablet by mouth daily      sertraline (Zoloft) 50 mg tablet Take 1 tablet (50 mg total) by mouth daily , start with 1/2 tablet for the first week. 30 tablet 5    nitrofurantoin (MACROBID) 100 mg capsule Take 1 capsule (100 mg total) by mouth 2 (two) times a day (Patient not taking: Reported on 2024) 14 capsule 1     No current facility-administered medications for this visit.     She is allergic to vancomycin..    Review of Systems  +hot flashes, +night sweats, +sleep disturbances  No breast, bladder, bowel  "changes. No new persistent pain, bloating, early satiety or pelvic pressure      Objective:      /70 (BP Location: Left arm, Patient Position: Sitting, Cuff Size: Standard)   Ht 5' 2\" (1.575 m)   Wt 68.9 kg (152 lb)   LMP 01/01/2024 (Approximate)   BMI 27.80 kg/m²          Physical Exam    General appearance: no distress, pleasant  Neck: s/p right partial thyroidectomy, no palpable adenopathy  Lymph nodes: no palpable adenopathy  Breasts: no masses, nodes or skin changes  Abdomen: soft, non tender, no palpable masses  Pelvic exam: normal external genitalia, urethral meatus normal, vagina without lesions, cervix without lesions, uterus small, non tender, no adnexal masses, non tender  Rectal exam: no masses, non tender, RV confirms above    "

## 2024-07-23 ENCOUNTER — ANNUAL EXAM (OUTPATIENT)
Dept: OBGYN CLINIC | Facility: CLINIC | Age: 54
End: 2024-07-23
Payer: COMMERCIAL

## 2024-07-23 VITALS
BODY MASS INDEX: 27.97 KG/M2 | HEIGHT: 62 IN | DIASTOLIC BLOOD PRESSURE: 70 MMHG | SYSTOLIC BLOOD PRESSURE: 112 MMHG | WEIGHT: 152 LBS

## 2024-07-23 DIAGNOSIS — E04.2 MULTINODULAR GOITER: ICD-10-CM

## 2024-07-23 DIAGNOSIS — N95.1 MENOPAUSAL SYMPTOMS: ICD-10-CM

## 2024-07-23 DIAGNOSIS — Z12.31 ENCOUNTER FOR SCREENING MAMMOGRAM FOR BREAST CANCER: ICD-10-CM

## 2024-07-23 DIAGNOSIS — Z01.419 ENCOUNTER FOR GYNECOLOGICAL EXAMINATION (GENERAL) (ROUTINE) WITHOUT ABNORMAL FINDINGS: Primary | ICD-10-CM

## 2024-07-23 DIAGNOSIS — N83.291 OTHER OVARIAN CYST, RIGHT SIDE: ICD-10-CM

## 2024-07-23 PROCEDURE — S0612 ANNUAL GYNECOLOGICAL EXAMINA: HCPCS | Performed by: OBSTETRICS & GYNECOLOGY

## 2024-07-23 RX ORDER — LEVOTHYROXINE SODIUM 0.07 MG/1
75 TABLET ORAL DAILY
COMMUNITY
Start: 2024-07-02 | End: 2025-07-02

## 2024-07-23 NOTE — ASSESSMENT & PLAN NOTE
Discussed with patient hot flashes and menopausal symptoms. Behavioral modification, avoidance of exaccerbating agents (alcohol, red wine, hot liquids), natural herbs (black cohash) and hormone replacement therapy reviewed. Aware of risks of estrogen including increased risk of clots in legs and lungs and cardiovascular events if started late in menopause.   Option for SSRI therapy with additional help with anxiety symptoms discussed.   Will try sertraline and RTO 4 months for consideration of other options if needed.   Agrees to plan

## 2024-07-23 NOTE — PATIENT INSTRUCTIONS
Try Citrucel or Benefiber daily to help with bowel regularity.  Avoid dairy and watch carbohydrates.

## 2024-07-23 NOTE — ASSESSMENT & PLAN NOTE
Asymptomatic. Reviewed 12/14/22 u/s: Uterus 7.7 cm with 6 mm EMS with small amt of fluid in lower uterine segment. R ov 7.3 cm with 4.9 and 5 cm cysts. L ov 2.5 cm, WNL.  Images reviewed. Right ovary with very thin septation and probable hemorrhagic cyst adjacent to simple cyst    Did not obtain recommended f/u ultrasound. Agrees to schedule, orders placed.

## 2024-07-23 NOTE — ASSESSMENT & PLAN NOTE
Here for well check, c/o menopausal symptoms (hot flashes, night sweats, decreased libido, anxiety). Last period 6 months ago.   Had partial thyroidectomy at [place] 5/2024, continues to follow with endocrine there.  No other breast or gyn concerns.   Normal breast and pelvic examss  Last pap 8/2021 neg/HPV neg; due next time.  Mammo order given, last 12/15/22, encouraged.  Colonoscopy 2020, due 2025; Dr Bhatti. May see earlier due to increase in stool frequency.

## 2024-08-14 DIAGNOSIS — N95.1 MENOPAUSAL SYMPTOMS: ICD-10-CM

## 2024-09-05 DIAGNOSIS — Z12.31 ENCOUNTER FOR SCREENING MAMMOGRAM FOR BREAST CANCER: ICD-10-CM

## 2024-09-10 DIAGNOSIS — N95.1 MENOPAUSAL SYMPTOMS: Primary | ICD-10-CM

## 2024-09-10 RX ORDER — ESTRADIOL 0.1 MG/D
1 FILM, EXTENDED RELEASE TRANSDERMAL 2 TIMES WEEKLY
Qty: 24 PATCH | Refills: 0 | Status: SHIPPED | OUTPATIENT
Start: 2024-09-12

## 2024-09-10 RX ORDER — PROGESTERONE 200 MG/1
1 CAPSULE ORAL DAILY
Qty: 36 CAPSULE | Refills: 0 | Status: SHIPPED | OUTPATIENT
Start: 2024-09-10

## 2024-11-22 NOTE — PROGRESS NOTES
Assessment/Plan:    Post-menopause on HRT (hormone replacement therapy)  Has been doing well on HRT since 2024. Resolution of hot flashes, night sweats, sleep disturbance.   No bleeding at completion of 12 day progesterone course.   No complaints or side effects.   Up to date with mammo 24 BIRADS 2B.   Discussed holding therapy during orthopedic surgery and initial recovery.   Refills sent  RTO annual exam     Diagnoses and all orders for this visit:    Post-menopause on HRT (hormone replacement therapy)    Menopausal symptoms  -     estradiol (Vivelle-Dot) 0.1 MG/24HR; Place 1 patch on the skin 2 (two) times a week  -     Progesterone (Prometrium) 200 MG CAPS; Take 1 capsule by mouth in the morning Day 1-12 of each month to start 10/1/24.    Other orders  -     levothyroxine 100 mcg tablet          Subjective:      Patient ID: Mansi Lamar is a 54 y.o. female.    HPI here for recheck    The following portions of the patient's history were reviewed and updated as appropriate: She  has a past medical history of H/O mammogram (10/01/2018), Multinodular goiter - s/p right lobectomy, Papanicolaou smear for cervical cancer screening (), and Postsurgical hypothyroidism.  She   Patient Active Problem List    Diagnosis Date Noted    Post-menopause on HRT (hormone replacement therapy) 2024    Multinodular goiter - s/p right lobectomy     Irregular bleeding 2023    Other ovarian cyst, right side 2023     She  has a past surgical history that includes  section; Tubal ligation; US guided thyroid biopsy (); Mammo (historical) (Bilateral, ); Colonoscopy (); and Thyroid lobectomy (Right).  Her family history includes Stroke in her maternal grandfather; Stroke (age of onset: 71) in her mother.  She  reports that she has never smoked. She has never used smokeless tobacco. She reports current alcohol use. She reports that she does not use drugs.  Current Outpatient Medications  "  Medication Sig Dispense Refill    [START ON 11/28/2024] estradiol (Vivelle-Dot) 0.1 MG/24HR Place 1 patch on the skin 2 (two) times a week 24 patch 3    levothyroxine 100 mcg tablet       levothyroxine 75 mcg tablet Take 75 mcg by mouth daily      Multiple Vitamin (MULTIVITAMINS PO) Take 1 tablet by mouth daily      Progesterone (Prometrium) 200 MG CAPS Take 1 capsule by mouth in the morning Day 1-12 of each month to start 10/1/24. 36 capsule 3    sertraline (ZOLOFT) 50 mg tablet TAKE 1 TABLET (50 MG TOTAL) BY MOUTH DAILY , START WITH 1/2 TABLET FOR THE FIRST WEEK. (Patient not taking: Reported on 11/26/2024) 90 tablet 1     No current facility-administered medications for this visit.     She is allergic to vancomycin..    Review of Systems  resolved hot flashes, night sweats, sleep disturbance    Objective:    /70 (BP Location: Left arm, Patient Position: Sitting, Cuff Size: Standard)   Ht 5' 2\" (1.575 m)   Wt 56.2 kg (124 lb)   BMI 22.68 kg/m²        Physical Exam    Appears well, no apparent distress.   Does not appear anxious or depressed.    "

## 2024-11-26 ENCOUNTER — OFFICE VISIT (OUTPATIENT)
Dept: OBGYN CLINIC | Facility: CLINIC | Age: 54
End: 2024-11-26
Payer: COMMERCIAL

## 2024-11-26 VITALS
BODY MASS INDEX: 22.82 KG/M2 | SYSTOLIC BLOOD PRESSURE: 110 MMHG | HEIGHT: 62 IN | WEIGHT: 124 LBS | DIASTOLIC BLOOD PRESSURE: 70 MMHG

## 2024-11-26 DIAGNOSIS — Z79.890 POST-MENOPAUSE ON HRT (HORMONE REPLACEMENT THERAPY): Primary | ICD-10-CM

## 2024-11-26 DIAGNOSIS — N95.1 MENOPAUSAL SYMPTOMS: ICD-10-CM

## 2024-11-26 PROCEDURE — 99213 OFFICE O/P EST LOW 20 MIN: CPT | Performed by: OBSTETRICS & GYNECOLOGY

## 2024-11-26 RX ORDER — ESTRADIOL 0.1 MG/D
1 FILM, EXTENDED RELEASE TRANSDERMAL 2 TIMES WEEKLY
Qty: 24 PATCH | Refills: 3 | Status: SHIPPED | OUTPATIENT
Start: 2024-11-28

## 2024-11-26 RX ORDER — LEVOTHYROXINE SODIUM 100 UG/1
TABLET ORAL
COMMUNITY
Start: 2024-11-20

## 2024-11-26 RX ORDER — PROGESTERONE 200 MG/1
1 CAPSULE ORAL DAILY
Qty: 36 CAPSULE | Refills: 3 | Status: SHIPPED | OUTPATIENT
Start: 2024-11-26

## 2024-11-26 NOTE — ASSESSMENT & PLAN NOTE
Has been doing well on HRT since 9/2024. Resolution of hot flashes, night sweats, sleep disturbance.   No bleeding at completion of 12 day progesterone course.   No complaints or side effects.   Up to date with mammo 9/6/24 BIRADS 2B.   Discussed holding therapy during orthopedic surgery and initial recovery.   Refills sent  RTO annual exam